# Patient Record
Sex: MALE | Race: WHITE | NOT HISPANIC OR LATINO | Employment: FULL TIME | URBAN - METROPOLITAN AREA
[De-identification: names, ages, dates, MRNs, and addresses within clinical notes are randomized per-mention and may not be internally consistent; named-entity substitution may affect disease eponyms.]

---

## 2019-02-19 ENCOUNTER — HOSPITAL ENCOUNTER (OUTPATIENT)
Facility: MEDICAL CENTER | Age: 24
End: 2019-02-19
Attending: ORTHOPAEDIC SURGERY | Admitting: ORTHOPAEDIC SURGERY
Payer: COMMERCIAL

## 2019-02-19 VITALS
WEIGHT: 213.19 LBS | TEMPERATURE: 97.1 F | RESPIRATION RATE: 16 BRPM | BODY MASS INDEX: 29.85 KG/M2 | HEART RATE: 85 BPM | SYSTOLIC BLOOD PRESSURE: 123 MMHG | OXYGEN SATURATION: 96 % | HEIGHT: 71 IN | DIASTOLIC BLOOD PRESSURE: 67 MMHG

## 2019-02-19 PROCEDURE — 160048 HCHG OR STATISTICAL LEVEL 1-5: Performed by: ORTHOPAEDIC SURGERY

## 2019-02-19 PROCEDURE — 160035 HCHG PACU - 1ST 60 MINS PHASE I: Performed by: ORTHOPAEDIC SURGERY

## 2019-02-19 PROCEDURE — 160047 HCHG PACU  - EA ADDL 30 MINS PHASE II: Performed by: ORTHOPAEDIC SURGERY

## 2019-02-19 PROCEDURE — 501838 HCHG SUTURE GENERAL: Performed by: ORTHOPAEDIC SURGERY

## 2019-02-19 PROCEDURE — A9270 NON-COVERED ITEM OR SERVICE: HCPCS | Performed by: ANESTHESIOLOGY

## 2019-02-19 PROCEDURE — A6222 GAUZE <=16 IN NO W/SAL W/O B: HCPCS | Performed by: ORTHOPAEDIC SURGERY

## 2019-02-19 PROCEDURE — 700111 HCHG RX REV CODE 636 W/ 250 OVERRIDE (IP)

## 2019-02-19 PROCEDURE — 160029 HCHG SURGERY MINUTES - 1ST 30 MINS LEVEL 4: Performed by: ORTHOPAEDIC SURGERY

## 2019-02-19 PROCEDURE — 700111 HCHG RX REV CODE 636 W/ 250 OVERRIDE (IP): Performed by: ANESTHESIOLOGY

## 2019-02-19 PROCEDURE — 160025 RECOVERY II MINUTES (STATS): Performed by: ORTHOPAEDIC SURGERY

## 2019-02-19 PROCEDURE — 160009 HCHG ANES TIME/MIN: Performed by: ORTHOPAEDIC SURGERY

## 2019-02-19 PROCEDURE — 160002 HCHG RECOVERY MINUTES (STAT): Performed by: ORTHOPAEDIC SURGERY

## 2019-02-19 PROCEDURE — 160036 HCHG PACU - EA ADDL 30 MINS PHASE I: Performed by: ORTHOPAEDIC SURGERY

## 2019-02-19 PROCEDURE — 500881 HCHG PACK, EXTREMITY: Performed by: ORTHOPAEDIC SURGERY

## 2019-02-19 PROCEDURE — 160041 HCHG SURGERY MINUTES - EA ADDL 1 MIN LEVEL 4: Performed by: ORTHOPAEDIC SURGERY

## 2019-02-19 PROCEDURE — 160022 HCHG BLOCK: Performed by: ORTHOPAEDIC SURGERY

## 2019-02-19 PROCEDURE — C1713 ANCHOR/SCREW BN/BN,TIS/BN: HCPCS | Performed by: ORTHOPAEDIC SURGERY

## 2019-02-19 PROCEDURE — 700101 HCHG RX REV CODE 250

## 2019-02-19 PROCEDURE — 500423 HCHG DRESSING, ABD COMBINE: Performed by: ORTHOPAEDIC SURGERY

## 2019-02-19 PROCEDURE — 160046 HCHG PACU - 1ST 60 MINS PHASE II: Performed by: ORTHOPAEDIC SURGERY

## 2019-02-19 PROCEDURE — 700102 HCHG RX REV CODE 250 W/ 637 OVERRIDE(OP): Performed by: ANESTHESIOLOGY

## 2019-02-19 PROCEDURE — A6454 SELF-ADHER BAND W>=3" <5"/YD: HCPCS | Performed by: ORTHOPAEDIC SURGERY

## 2019-02-19 DEVICE — SUTURE ANCHOR TWINFIX 3.5 WITH NEEDLES SMALL JOINT: Type: IMPLANTABLE DEVICE | Site: ANKLE | Status: FUNCTIONAL

## 2019-02-19 RX ORDER — HALOPERIDOL 5 MG/ML
1 INJECTION INTRAMUSCULAR
Status: DISCONTINUED | OUTPATIENT
Start: 2019-02-19 | End: 2019-02-19 | Stop reason: HOSPADM

## 2019-02-19 RX ORDER — DIPHENHYDRAMINE HYDROCHLORIDE 50 MG/ML
12.5 INJECTION INTRAMUSCULAR; INTRAVENOUS
Status: DISCONTINUED | OUTPATIENT
Start: 2019-02-19 | End: 2019-02-19 | Stop reason: HOSPADM

## 2019-02-19 RX ORDER — LIDOCAINE HYDROCHLORIDE 10 MG/ML
INJECTION, SOLUTION INFILTRATION; PERINEURAL
Status: COMPLETED
Start: 2019-02-19 | End: 2019-02-19

## 2019-02-19 RX ORDER — OXYCODONE HCL 5 MG/5 ML
5 SOLUTION, ORAL ORAL
Status: DISCONTINUED | OUTPATIENT
Start: 2019-02-19 | End: 2019-02-19 | Stop reason: HOSPADM

## 2019-02-19 RX ORDER — MEPERIDINE HYDROCHLORIDE 25 MG/ML
12.5 INJECTION INTRAMUSCULAR; INTRAVENOUS; SUBCUTANEOUS
Status: DISCONTINUED | OUTPATIENT
Start: 2019-02-19 | End: 2019-02-19 | Stop reason: HOSPADM

## 2019-02-19 RX ORDER — METOPROLOL TARTRATE 1 MG/ML
1 INJECTION, SOLUTION INTRAVENOUS
Status: DISCONTINUED | OUTPATIENT
Start: 2019-02-19 | End: 2019-02-19 | Stop reason: HOSPADM

## 2019-02-19 RX ORDER — ONDANSETRON 2 MG/ML
4 INJECTION INTRAMUSCULAR; INTRAVENOUS
Status: COMPLETED | OUTPATIENT
Start: 2019-02-19 | End: 2019-02-19

## 2019-02-19 RX ORDER — SODIUM CHLORIDE, SODIUM LACTATE, POTASSIUM CHLORIDE, CALCIUM CHLORIDE 600; 310; 30; 20 MG/100ML; MG/100ML; MG/100ML; MG/100ML
INJECTION, SOLUTION INTRAVENOUS ONCE
Status: DISCONTINUED | OUTPATIENT
Start: 2019-02-19 | End: 2019-02-19 | Stop reason: HOSPADM

## 2019-02-19 RX ORDER — MIDAZOLAM HYDROCHLORIDE 1 MG/ML
1 INJECTION INTRAMUSCULAR; INTRAVENOUS
Status: DISCONTINUED | OUTPATIENT
Start: 2019-02-19 | End: 2019-02-19 | Stop reason: HOSPADM

## 2019-02-19 RX ORDER — ACETAMINOPHEN 500 MG
1000 TABLET ORAL ONCE
Status: COMPLETED | OUTPATIENT
Start: 2019-02-19 | End: 2019-02-19

## 2019-02-19 RX ORDER — CELECOXIB 200 MG/1
200 CAPSULE ORAL ONCE
Status: COMPLETED | OUTPATIENT
Start: 2019-02-19 | End: 2019-02-19

## 2019-02-19 RX ORDER — ACETAMINOPHEN 500 MG
1000 TABLET ORAL
COMMUNITY

## 2019-02-19 RX ORDER — OXYCODONE HCL 5 MG/5 ML
10 SOLUTION, ORAL ORAL
Status: DISCONTINUED | OUTPATIENT
Start: 2019-02-19 | End: 2019-02-19 | Stop reason: HOSPADM

## 2019-02-19 RX ADMIN — ONDANSETRON 4 MG: 2 INJECTION INTRAMUSCULAR; INTRAVENOUS at 11:16

## 2019-02-19 RX ADMIN — CELECOXIB 200 MG: 200 CAPSULE ORAL at 08:01

## 2019-02-19 RX ADMIN — ACETAMINOPHEN 1000 MG: 500 TABLET, FILM COATED ORAL at 08:01

## 2019-02-19 ASSESSMENT — PAIN SCALES - WONG BAKER
WONGBAKER_NUMERICALRESPONSE: DOESN'T HURT AT ALL

## 2019-02-19 NOTE — OR SURGEON
Immediate Post OP Note    PreOp Diagnosis: Right ankle instability    PostOp Diagnosis: Same    Procedure(s):  ANKLE ARTHROSCOPY - Wound Class: Clean  LIGAMENT REPAIR- ANTERIOR TALOFIBULLAR/ CALCANEO-FIBULAR - Wound Class: Clean  TENDON REPAIR- BREVIS - Wound Class: Clean    Surgeon(s):  Damien Maldonado M.D.    Anesthesiologist/Type of Anesthesia:  Anesthesiologist: Rambo Silva M.D./General    Surgical Staff:  Circulator: Valerie Webb R.N.  Relief Circulator: Marty Mac R.N.  Scrub Person: Bharati Byrd; Humberto Zamarripa C.N.A.    Specimens removed if any:  * No specimens in log *    Estimated Blood Loss: 5cc    TT: 24 min @ 250mmHg    Findings: Lateral instability, arthrofibrosis without cartilage defect    Complications: None        2/19/2019 9:13 AM Damien Maldonado M.D.

## 2019-02-19 NOTE — DISCHARGE INSTRUCTIONS
ACTIVITY: Rest and take it easy for the first 24 hours.  A responsible adult is recommended to remain with you during that time.  It is normal to feel sleepy.  We encourage you to not do anything that requires balance, judgment or coordination.    MILD FLU-LIKE SYMPTOMS ARE NORMAL. YOU MAY EXPERIENCE GENERALIZED MUSCLE ACHES, THROAT IRRITATION, HEADACHE AND/OR SOME NAUSEA.    FOR 24 HOURS DO NOT:  Drive, operate machinery or run household appliances.  Drink beer or alcoholic beverages.   Make important decisions or sign legal documents.      SPECIAL INSTRUCTIONS:     SEE FOLDER GIVEN TO YOU BY DR. MALDONADO    Non weight bearing RIGHT LEG  Ice and elevate   Stay ahead of pain   Keep splint clean and dry   Aspirin 81mg two times daily for clot prevention      DIET: To avoid nausea, slowly advance diet as tolerated, avoiding spicy or greasy foods for the first day.  Add more substantial food to your diet according to your physician's instructions.  Babies can be fed formula or breast milk as soon as they are hungry.  INCREASE FLUIDS AND FIBER TO AVOID CONSTIPATION.    SURGICAL DRESSING/BATHING: *Keep splint clean and dry **    FOLLOW-UP APPOINTMENT:  A follow-up appointment should be arranged with your doctor; call to schedule  Dr. Maldonado [phone].    You should CALL YOUR PHYSICIAN if you develop:  Fever greater than 101 degrees F.  Pain not relieved by medication, or persistent nausea or vomiting.  Excessive bleeding (blood soaking through dressing) or unexpected drainage from the wound.  Extreme redness or swelling around the incision site, drainage of pus or foul smelling drainage.  Inability to urinate or empty your bladder within 8 hours.  Problems with breathing or chest pain.    You should call 911 if you develop problems with breathing or chest pain.  If you are unable to contact your doctor or surgical center, you should go to the nearest emergency room or urgent care center.  Physician's telephone #:  *Dr. Maldonado [phone]**    If any questions arise, call your doctor.  If your doctor is not available, please feel free to call the Surgical Center at [phone].  The Center is open Monday through Friday from 7AM to 7PM.  You can also call the HEALTH HOTLINE open 24 hours/day, 7 days/week and speak to a nurse at [phone], or toll free at [phone].    A registered nurse may call you a few days after your surgery to see how you are doing after your procedure.    MEDICATIONS: Resume taking daily medication.  Take prescribed pain medication with food.  If no medication is prescribed, you may take non-aspirin pain medication if needed.  PAIN MEDICATION CAN BE VERY CONSTIPATING.  Take a stool softener or laxative such as senokot, pericolace, or milk of magnesia if needed.    Prescription given to pt prior.   Aspirin 81mg take one tablet two times daily for clot prevention. May take your pain medication at any time.     If your physician has prescribed pain medication that includes Acetaminophen (Tylenol), do not take additional Acetaminophen (Tylenol) while taking the prescribed medication.    Depression / Suicide Risk    As you are discharged from this [facility] facility, it is important to learn how to keep safe from harming yourself.    Recognize the warning signs:  · Abrupt changes in personality, positive or negative- including increase in energy   · Giving away possessions  · Change in eating patterns- significant weight changes-  positive or negative  · Change in sleeping patterns- unable to sleep or sleeping all the time   · Unwillingness or inability to communicate  · Depression  · Unusual sadness, discouragement and loneliness  · Talk of wanting to die  · Neglect of personal appearance   · Rebelliousness- reckless behavior  · Withdrawal from people/activities they love  · Confusion- inability to concentrate     If you or a loved one observes any of these behaviors or has concerns  about self-harm, here's what you can do:  · Talk about it- your feelings and reasons for harming yourself  · Remove any means that you might use to hurt yourself (examples: pills, rope, extension cords, firearm)  · Get professional help from the community (Mental Health, Substance Abuse, psychological counseling)  · Do not be alone:Call your Safe Contact- someone whom you trust who will be there for you.  · Call your local CRISIS HOTLINE 071-6447 or 587-381-9746  · Call your local Children's Mobile Crisis Response Team [place] (128) 378-7171 or www.[facility]  · Call the toll free National Suicide Prevention Hotlines   · National Suicide Prevention Lifeline 011-238-ZYZE (4556)  · National Hope Line Network 800-SUICIDE (716-2575)

## 2019-02-19 NOTE — OP REPORT
DATE OF SERVICE:  02/19/2019    PREOPERATIVE DIAGNOSES:  1.  Right ankle instability.  2.  Right ankle arthrofibrosis.    POSTOPERATIVE DIAGNOSES:  1.  Right ankle instability.  2.  Right ankle arthrofibrosis.    PROCEDURES PERFORMED:  1.  Right ankle arthroscopy with extensive debridement.  2.  Right ATFL repair.  3.  Right CFL repair.    SURGEON:  Damien Maldonado MD    ANESTHESIA:  General with peripheral nerve block requested by me for   postoperative pain control.    ESTIMATED BLOOD LOSS:  5 mL.    TOURNIQUET TIME:  250 mmHg.    FINDINGS:  Lateral instability.  Mild tenosynovitic tissue around the peroneal   tendons.  Arthrofibrotic tissue within the joint without full-thickness   cartilage defect.    IMPLANTS:  [employer] [employer] 3.5 mm titanium anchor x1.    COMPLICATIONS:  None.    OUTCOME:  To PACU in stable condition.    HISTORY OF PRESENT ILLNESS:  This is a pleasant 24-year-old gentleman whom I   have been treating for right ankle instability with attempted bracing,   antiinflammatories and physical therapy without lasting success.  He is   indicated for the above stated procedure.  He was greeted in the preoperative   holding area and identified by name and medical record number.  The right   lower extremity was marked.  Risks of the procedure including bleeding,   infection, pain, malunion, nonunion, continuation of symptoms, neurovascular   damage, need for more surgery were discussed and he provided a written   consent.    DESCRIPTION OF PROCEDURE:  He was taken to operating room and placed on the   table in supine position.  Preoperative antibiotics were administered, general   anesthesia was induced.  A nonsterile tourniquet was placed on his right   thigh.  Right lower extremity prepped and draped in the usual sterile fashion.    An operative pause was undertaken, where all present were in agreement with   the patient identification, laterality and procedure to be performed.    Right ankle  arthroscopy with extensive debridement:  The joint was insufflated   with 10 mL of sterile normal saline.  A medial portal was established at the   level of joint just medial to the tibialis anterior tendon.  A small joint   arthroscope was introduced.  Immediately seen was extensive arthrofibrotic   tissue across the joint and small anterior distal tibial and dorsal talar neck   osteophytes.  Lateral portal was established followed by a small joint   shaver.  Vastus ligament was excised.  We removed small osteophytes and scar   tissue along the anterior distal tibia from the medial gutter as well as along   the dorsal talar neck.  We then evaluated the entirety of the joint surface   and did not find any full-thickness cartilage defects.    ATFL and CFL repair:  A 3-cm curvilinear incision was made along the   anterolateral border of the distal fibula.  Blunt dissection carried down to   the peroneal tendons, which were inspected.  There was some mild tenosynovitic   tissue around both of them that was excised.  They were protected while the   ATFL and CFL were released from the origin on the fibula.  A rongeur was used   to remove osteophytes.  A 3.5 mm titanium anchor was placed 1 cm from the tip   of the fibula.  One limb of suture passed through the CFL in modified   Marvin-Rito fashion, the other through the ATFL in modified Marvin-Rito   fashion.  CFL sutures tied with the ankle in eversion, ATFL sutures with the   ankle in relaxed plantar flexion and posterior drawer, both talar tilt and   anterior drawer resolved at this point.  The repair was then oversewn with 0   Vicryl suture in figure-of-eight fashion.  The tourniquet was let down.    Hemostasis was achieved.  Subcutaneous layer closed with 3-0 Monocryl in   buried interrupted fashion and skin closed with 3-0 nylon in horizontal   mattress fashion.  Adaptic gauze and a well-padded posterior splint with a   stirrup were placed.  The patient was awakened  and extubated in stable   condition.    POSTOPERATIVE COURSE:  He will be discharged home today assuming adequate pain   control and mobilization.  Nonweightbearing to the right lower extremity.    Aspirin 81 mg twice daily for DVT prophylaxis.  I will see him in 10-14 days   for suture removal and wound check and we will progress weightbearing at that   time.       ____________________________________     MD DANIEL OROURKE / KALIN    DD:  02/19/2019 10:41:37  DT:  02/19/2019 11:04:47    D#:  [id number]  Job#:  [id number]

## 2019-02-19 NOTE — OR NURSING
Pt arrived to phase 2. VSS. RLE elevated on pillows and ice to it. Pt A&Ox4. Pt and wife educated on POC for phase 2 and discharge. Pain controlled, rated 0/10. Nausea improved post crackers and zofran. Discharge instructions reviewed with pt and wife who v/u of them and f/u care. IV removed for discharge.

## 2019-07-16 ENCOUNTER — TELEPHONE (OUTPATIENT)
Dept: SCHEDULING | Facility: IMAGING CENTER | Age: 24
End: 2019-07-16

## 2019-08-27 ENCOUNTER — OFFICE VISIT (OUTPATIENT)
Dept: MEDICAL GROUP | Facility: LAB | Age: 24
End: 2019-08-27
Payer: COMMERCIAL

## 2019-08-27 VITALS
HEART RATE: 86 BPM | WEIGHT: 212 LBS | OXYGEN SATURATION: 96 % | BODY MASS INDEX: 29.68 KG/M2 | HEIGHT: 71 IN | TEMPERATURE: 98.5 F | RESPIRATION RATE: 16 BRPM | SYSTOLIC BLOOD PRESSURE: 122 MMHG | DIASTOLIC BLOOD PRESSURE: 70 MMHG

## 2019-08-27 DIAGNOSIS — L91.8 SKIN TAG: ICD-10-CM

## 2019-08-27 DIAGNOSIS — K14.8 TONGUE LESION: ICD-10-CM

## 2019-08-27 PROCEDURE — 99204 OFFICE O/P NEW MOD 45 MIN: CPT | Performed by: NURSE PRACTITIONER

## 2019-08-27 ASSESSMENT — PATIENT HEALTH QUESTIONNAIRE - PHQ9: CLINICAL INTERPRETATION OF PHQ2 SCORE: 0

## 2019-08-27 NOTE — PROGRESS NOTES
Chief Complaint   Patient presents with   • Other       Subjective:     HPI:   Bogdan Stevenson is a 24 y.o. male. This pleasant patient is here today to establish care and discuss the following medical problems. He lives in [place].  He is accompanied by his wife and daughter.  Patient reports that approximately 6 months ago he noticed a small bump underneath the right side of his tongue.  He did not think much of it and was at a recent dentist visit where they mentioned the bump and said he should probably have that looked at.  He does report in the last 2 to 3 weeks it has grown quite a bit larger.  He does have pain associated with this as he reports that it does get irritated as it hooks on his teeth now.  He does not have any history of STD, chewing tobacco use or smoking.        ROS  Constitutional: Negative for fever, chills and malaise/fatigue. No changes in weight.  HENT: Skin lesion on tongue.  Negative for congestion, no sore throat, no hearing loss, no bloody noses.  Eyes: Negative for vision changes.   Respiratory: Negative for cough and shortness of breath.    Cardiovascular: No chest pain, no palpitations. Negative for leg swelling.   Gastrointestinal: Negative for nausea, vomiting, abdominal pain and diarrhea.   Genitourinary: Negative for dysuria and hematuria.   Musculoskeletal: Negative for myalgias  Skin: Negative for rash.   Neurological: Negative for dizziness, focal weakness and headaches. No numbness/tingling.  Endo/Heme/Allergies: Does not bruise/bleed easily.   Psychiatric/Behavioral: Negative for depression.  The patient is not nervous/anxious.      No Known Allergies    Current medicines (including changes today)  Current Outpatient Medications   Medication Sig Dispense Refill   • acetaminophen (TYLENOL) 500 MG Tab Take 1,000 mg by mouth 1 time daily as needed.     • aspirin EC (ECOTRIN) 81 MG Tablet Delayed Response Take 1 Tab by mouth 2 Times a Day.       No current facility-administered  "medications for this visit.      He  has a past medical history of Anesthesia.  He  has a past surgical history that includes tonsillectomy (2006); ankle arthroscopy (Right, 2/19/2019); ligament repair (Right, 2/19/2019); and tendon repair (Right, 2/19/2019).  Social History     Tobacco Use   • Smoking status: Never Smoker   • Smokeless tobacco: Never Used   Substance Use Topics   • Alcohol use: Yes     Comment: 2-3 a week   • Drug use: No       Family History   Problem Relation Age of Onset   • No Known Problems Mother    • No Known Problems Father      No family status information on file.       Patient Active Problem List    Diagnosis Date Noted   • Skin tag 08/27/2019          Objective:     /70 (BP Location: Left arm, Patient Position: Sitting, BP Cuff Size: Adult)   Pulse 86   Temp 36.9 °C (98.5 °F) (Temporal)   Resp 16   Ht 1.803 m (5' 11\")   Wt 96.2 kg (212 lb)   SpO2 96%  Body mass index is 29.57 kg/m².    Physical Exam:  Constitutional: Well-developed and well-nourished. Not diaphoretic. No distress.   Skin: Skin is warm and dry. No rash noted.  Head: Atraumatic without lesions.  Eyes: Conjunctivae and extraocular motions are normal. Pupils are equal, round, and reactive to light. No scleral icterus.   Ears:  External ears unremarkable. Tympanic membranes clear and intact.  Nose: Nares patent. Mucosa without edema or erythema. No discharge. No facial tenderness.  Mouth/Throat: Irritated flesh-colored growth on underside of tongue consistent with skin tag. Oropharynx is clear and moist. Posterior pharynx without erythema or exudates.  Neck: Supple, trachea midline. No thyromegaly present. No cervical or supraclavicular lymphadenopathy.  Cardiovascular: Regular rate and rhythm without murmur. Carotid and radial pulses are intact and equal bilaterally.  Pulmonary: Effort normal. Clear to auscultation throughout. No adventitious sounds.   Abdomen: Soft, non tender, and without distention. Normal " bowel sounds. No rebound, guarding, masses or hepatosplenomegaly.  Musculoskeletal: No cyanosis, clubbing, erythema, nor edema.   Neurological: Alert and oriented x 3.  Sensation intact.   Psychiatric:  Behavior, mood, and affect are appropriate.  Judgement and insight is normal.         Assessment and Plan:     The following treatment plan was discussed:    1. Tongue lesion  New problem.  Referral to ENT for removal of irritated and painful skin tag which has grown significantly over the last several weeks.   - REFERRAL TO ENT        Any change or worsening of signs or symptoms, patient encouraged to follow-up or report to emergency room for further evaluation. Patient verbalizes understanding and agrees.    Follow-Up: Return if symptoms worsen or fail to improve, for Preventative/Annual physical.      PLEASE NOTE: This dictation was created using voice recognition software. I have made every reasonable attempt to correct obvious errors, but I expect that there are errors of grammar and possibly content that I did not discover before finalizing the note.

## (undated) DEVICE — PROTECTOR ULNA NERVE - (36PR/CA)

## (undated) DEVICE — WATER IRRIG. STER. 1500 ML - (9/CA)

## (undated) DEVICE — PADDING CAST 6 IN STERILE - 6 X 4 YDS (24/CA)

## (undated) DEVICE — HEAD HOLDER JUNIOR/ADULT

## (undated) DEVICE — DRESSING ABDOMINAL PAD STERILE 8 X 10" (360EA/CA)"

## (undated) DEVICE — DRESSING 3X3 ADAPTIC GAUZE - (50EA/CT)

## (undated) DEVICE — SUTURE 3-0 ETHILON FS-1 - (36/BX) 30 INCH

## (undated) DEVICE — SET LEADWIRE 5 LEAD BEDSIDE DISPOSABLE ECG (1SET OF 5/EA)

## (undated) DEVICE — ELECTRODE DUAL RETURN W/ CORD - (50/PK)

## (undated) DEVICE — SENSOR SPO2 NEO LNCS ADHESIVE (20/BX) SEE USER NOTES

## (undated) DEVICE — GLOVE BIOGEL INDICATOR SZ 8.5 SURGICAL PF LTX - (50/BX 4BX/CA)

## (undated) DEVICE — CHLORAPREP 26 ML APPLICATOR - ORANGE TINT(25/CA)

## (undated) DEVICE — GLOVE BIOGEL INDICATOR SZ 7.5 SURGICAL PF LTX - (50PR/BX 4BX/CA)

## (undated) DEVICE — TUBING PATIENT W/CONNECTOR REDEUCE (1EA)

## (undated) DEVICE — DRAPE LARGE 3 QUARTER - (20/CA)

## (undated) DEVICE — SLEEVE, VASO, THIGH, MED

## (undated) DEVICE — STOCKINET BIAS 6 IN STERILE - (20/CA)

## (undated) DEVICE — KIT ROOM DECONTAMINATION

## (undated) DEVICE — CANISTER SUCTION 3000ML MECHANICAL FILTER AUTO SHUTOFF MEDI-VAC NONSTERILE LF DISP  (40EA/CA)

## (undated) DEVICE — NEEDLE SAFETY PRO. 18 GA 1-1/2 IN (10BX/CA 50/BX)**WHEN THESE ARE GONE ORDER #65241**

## (undated) DEVICE — GLOVE BIOGEL SZ 6.5 SURGICAL PF LTX (50PR/BX 4BX/CA)

## (undated) DEVICE — SODIUM CHL IRRIGATION 0.9% 1000ML (12EA/CA)

## (undated) DEVICE — GLOVE BIOGEL SZ 7 SURGICAL PF LTX - (50PR/BX 4BX/CA)

## (undated) DEVICE — BLOCK

## (undated) DEVICE — WRAP CO-FLEX 4IN X 5YD STERIL - SELF-ADHERENT (18/CA)

## (undated) DEVICE — SODIUM CHL. IRRIGATION 0.9% 3000ML (4EA/CA 65CA/PF)

## (undated) DEVICE — LACTATED RINGERS INJ 1000 ML - (14EA/CA 60CA/PF)

## (undated) DEVICE — MASK ANESTHESIA ADULT  - (100/CA)

## (undated) DEVICE — PACK LOWER EXTREMITY - (2/CA)

## (undated) DEVICE — MASK, LARYNGEAL AIRWAY #5

## (undated) DEVICE — TUBING PUMP WITH CONNECTOR REDEUCE (1EA)

## (undated) DEVICE — NEPTUNE 4 PORT MANIFOLD - (20/PK)

## (undated) DEVICE — BLADE SURGICAL #15 - (50/BX 3BX/CA)

## (undated) DEVICE — TUBING CLEARLINK DUO-VENT - C-FLO (48EA/CA)

## (undated) DEVICE — NEEDLE SAFETY 18 GA X 1 1/2 IN (100EA/BX)

## (undated) DEVICE — NEEDLE W/FACET TIP DULL VERSION W/STIMULATION CABLE SONOPLEX 21G X 4 (10/EA)"

## (undated) DEVICE — SUTURE GENERAL

## (undated) DEVICE — TOWELS CLOTH SURGICAL - (4/PK 20PK/CA)

## (undated) DEVICE — SHAVER 3.5 SM JT AGGRES.MEN. (5EA/BX)

## (undated) DEVICE — TOURNIQUET CUFF 34 X 4 ONE PORT DISP - STERILE (10/BX)

## (undated) DEVICE — SUCTION INSTRUMENT YANKAUER BULBOUS TIP W/O VENT (50EA/CA)

## (undated) DEVICE — GLOVE BIOGEL SZ 8 SURGICAL PF LTX - (50PR/BX 4BX/CA)

## (undated) DEVICE — SUTURE 3-0 MONOCRYL PLUS PS-1 - 27 INCH (36/BX)

## (undated) DEVICE — GOWN WARMING STANDARD FLEX - (30/CA)

## (undated) DEVICE — SUTURE 0 VICRYL PLUS CT-2 - 8 X 18 INCH (12/BX)

## (undated) DEVICE — GLOVE BIOGEL INDICATOR SZ 6.5 SURGICAL PF LTX - (50PR/BX 4BX/CA)

## (undated) DEVICE — KIT ANESTHESIA W/CIRCUIT & 3/LT BAG W/FILTER (20EA/CA)

## (undated) DEVICE — SET EXTENSION WITH 2 PORTS (48EA/CA) ***PART #2C8610 IS A SUBSTITUTE*****

## (undated) DEVICE — ELECTRODE 850 FOAM ADHESIVE - HYDROGEL RADIOTRNSPRNT (50/PK)

## (undated) DEVICE — DETERGENT RENUZYME PLUS 10 OZ PACKET (50/BX)